# Patient Record
Sex: MALE | Race: WHITE
[De-identification: names, ages, dates, MRNs, and addresses within clinical notes are randomized per-mention and may not be internally consistent; named-entity substitution may affect disease eponyms.]

---

## 2018-02-23 ENCOUNTER — HOSPITAL ENCOUNTER (EMERGENCY)
Dept: HOSPITAL 45 - C.EDB | Age: 28
Discharge: HOME | End: 2018-02-23
Payer: COMMERCIAL

## 2018-02-23 VITALS — SYSTOLIC BLOOD PRESSURE: 120 MMHG | OXYGEN SATURATION: 98 % | HEART RATE: 70 BPM | DIASTOLIC BLOOD PRESSURE: 61 MMHG

## 2018-02-23 VITALS
BODY MASS INDEX: 26.64 KG/M2 | WEIGHT: 196.65 LBS | HEIGHT: 72.01 IN | WEIGHT: 196.65 LBS | BODY MASS INDEX: 26.64 KG/M2 | HEIGHT: 72.01 IN

## 2018-02-23 VITALS — OXYGEN SATURATION: 97 %

## 2018-02-23 VITALS — TEMPERATURE: 97.34 F
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
## 2018-02-23 DIAGNOSIS — R21: Primary | ICD-10-CM
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
## 2018-02-23 DIAGNOSIS — T78.40XA: ICD-10-CM

## 2018-02-23 LAB
BASOPHILS # BLD: 0.08 K/UL (ref 0–0.2)
BASOPHILS NFR BLD: 0.4 %
BUN SERPL-MCNC: 39 MG/DL (ref 7–18)
CALCIUM SERPL-MCNC: 8.8 MG/DL (ref 8.5–10.1)
CO2 SERPL-SCNC: 25 MMOL/L (ref 21–32)
CREAT SERPL-MCNC: 1.27 MG/DL (ref 0.6–1.4)
EOS ABS #: 0.42 K/UL (ref 0–0.5)
EOSINOPHIL NFR BLD AUTO: 208 K/UL (ref 130–400)
EOSINOPHIL NFR BLD AUTO: 271 K/UL (ref 130–400)
GLUCOSE SERPL-MCNC: 97 MG/DL (ref 70–99)
HCT VFR BLD CALC: 42.6 % (ref 42–52)
HCT VFR BLD CALC: 47.3 % (ref 42–52)
HGB BLD-MCNC: 14.5 G/DL (ref 14–18)
HGB BLD-MCNC: 16.2 G/DL (ref 14–18)
IG#: 0.05 K/UL (ref 0–0.02)
IMM GRANULOCYTES NFR BLD AUTO: 14 %
LYMPHOCYTES # BLD: 2.86 K/UL (ref 1.2–3.4)
MCH RBC QN AUTO: 32.9 PG (ref 25–34)
MCH RBC QN AUTO: 33.2 PG (ref 25–34)
MCHC RBC AUTO-ENTMCNC: 34 G/DL (ref 32–36)
MCHC RBC AUTO-ENTMCNC: 34.2 G/DL (ref 32–36)
MCV RBC AUTO: 96.6 FL (ref 80–100)
MCV RBC AUTO: 96.9 FL (ref 80–100)
MONO ABS #: 1.54 K/UL (ref 0.11–0.59)
MONOCYTES NFR BLD: 7.5 %
NEUT ABS #: 15.49 K/UL (ref 1.4–6.5)
NEUTROPHILS # BLD AUTO: 2.1 %
NEUTROPHILS NFR BLD AUTO: 75.8 %
PMV BLD AUTO: 10.1 FL (ref 7.4–10.4)
PMV BLD AUTO: 10.2 FL (ref 7.4–10.4)
POTASSIUM SERPL-SCNC: 3.8 MMOL/L (ref 3.5–5.1)
RED CELL DISTRIBUTION WIDTH CV: 13.5 % (ref 11.5–14.5)
RED CELL DISTRIBUTION WIDTH CV: 13.6 % (ref 11.5–14.5)
RED CELL DISTRIBUTION WIDTH SD: 47.6 FL (ref 36.4–46.3)
RED CELL DISTRIBUTION WIDTH SD: 48.5 FL (ref 36.4–46.3)
SODIUM SERPL-SCNC: 138 MMOL/L (ref 136–145)
WBC # BLD AUTO: 12.93 K/UL (ref 4.8–10.8)
WBC # BLD AUTO: 20.44 K/UL (ref 4.8–10.8)

## 2018-02-23 NOTE — DIAGNOSTIC IMAGING REPORT
CHEST 2 VIEWS ROUTINE



CLINICAL HISTORY: chest tightness, allergic reaction    



COMPARISON STUDY:  No previous studies for comparison.



FINDINGS: The cardiac and mediastinal contours are normal. There is no evidence

of focal pulmonary consolidation. There is no evidence of failure. No pleural

effusions are visualized.[ 



IMPRESSION: No active disease in the chest.







Electronically signed by:  Braeden Núñez M.D.

2/23/2018 5:04 PM



Dictated Date/Time:  2/23/2018 5:03 PM

## 2018-02-23 NOTE — EMERGENCY ROOM VISIT NOTE
ED Visit Note


First contact with patient:  16:19


CHIEF  COMPLAINT:  Allergic reaction





HISTORY OF PRESENTING ILLNESS: This is a 27-year-old male who presents to the 

emergency department with complaint of an allergic reaction today.  Patient 

states that he went for a 9 mile run earlier today outside, states he felt fine 

during the run and immediately after the run.  He states while he was driving 

home that he started to feel some tightness in his chest.  When he got home, he 

vomited 1 and the tightness in his chest resolved.  He states that his vomit 

looked black with bits of food in it.  He does note that he ate a hamburger 

late last night.  He states that after he got out of the shower, he noticed 

hives all over his chest.  He reports a bad allergic reaction with hives in the 

past, for which he was given an Epi-Pen, and he felt this reaction was similar, 

so he gave himself his EpiPen injection.  He states that his heart was racing 

and he felt nauseated afterward, but his hives have not improved.  He denies 

any symptoms of facial or lip swelling, tongue swelling, tightness in his throat

, difficulty breathing, wheezing, dizziness or syncope.  He currently denies 

any chest pain or SOB, and states "I pretty much feel back to normal, but I 

wanted to get checked out."  He denies any new foods, lotions, detergents,  

medications, or other exposures.





REVIEW OF SYSTEMS: A complete 10 point review of systems was reviewed with the 

patient with pertinent positives and negatives as per history of present 

illness. All else were negative.





PAST MEDICAL HISTORY:  No significant past medical or surgical history.  UTD on 

immunizations. 





SOCIAL HISTORY: Lives at home.  Denies tobacco use, reports occasional alcohol 

use, denies illicit drug use.





ALLERGIES: No known allergies.





PHYSICAL EXAM:


CONSTITUTIONAL: Pleasant and cooperative.  No acute distress. Well appearing 

and well nourished. 


HEENT: Normocephalic, atraumatic.  PERRL, EOMI, normal conjunctiva bilaterally. 

TMs normal.  No facial or lip swelling.  No tongue swelling.  Pharynx normal, 

no pharyngeal edema.  Patent airway.  Moist mucous membranes.


NECK: Supple, full active range of motion without discomfort.  No cervical 

adenopathy.


RESPIRATORY: Clear to auscultation bilaterally with no wheezing, crackles, 

rhonchi or stridor. Equal expansion bilaterally.


CARDIOVASCULAR: Regular rate and rhythm with no murmurs, rubs or gallops. 

Normal peripheral perfusion. No edema.


GASTROINTESTINAL: Soft, nontender, nondistended. No palpable masses or HSM. 

Bowel sounds present in all quadrants. 


MUSCULOSKELETAL: Full range of motion of all joints without discomfort.


INTEGUMENTARY: There is a red rash on the anterior chest and abdomen, some 

lesions appear urticarial, some appear more consistent with insect bite.  

Nontender to palpation.  Blanching.  No pustules or discharge noted.  No other 

significant dermatologic conditions noted.


NEUROLOGIC: Alert and oriented X 4 with normal affect.  Cranial nerves II-XII 

grossly intact. No focal neurologic deficits noted.  Normal strength and 

sensation in all 4 extremities.  Normal speech.  Normal gait observed.








ED COURSE AND MEDICAL DECISION MAKING: 





CC: Patient presenting with complaint of allergic reaction





DIFFERENTIAL DIAGNOSIS:  Includes, but not limited to allergic reaction, insect 

bites, rash, folliculitis, gastroenteritis, gastritis, peptic ulcer disease, 

GERD, among others.





INTERPRETATION OF LABS: Leukocytosis with left shift, no anemia, no significant 

electrolyte abnormalities, normal renal function.





IMAGING:  


CHEST 2 VIEWS ROUTINE





CLINICAL HISTORY: chest tightness, allergic reaction    





COMPARISON STUDY:  No previous studies for comparison.





FINDINGS: The cardiac and mediastinal contours are normal. There is no evidence


of focal pulmonary consolidation. There is no evidence of failure. No pleural


effusions are visualized.





IMPRESSION: No active disease in the chest.





EKG: Shows normal sinus rhythm with sinus arrhythmia, no acute ischemic changes 

by my interpretation.  No previous EKGs available for comparison.





MEDICATION RECONCILIATION:  I attest that I have personally reviewed the patient

's current medication list.





INITIAL VITAL SIGNS REVIEW:  I reviewed the patient's initial vital signs and 

interpret them as follows: T: Afebrile;  BP: Normotensive;  HR: Within normal 

limits;  RR: Tachypneic;  Pulse Ox: Within normal limits on room air.


Blood pressure screening: The patient was found to have normal blood pressure 

on screening and does not require follow-up for repeat blood pressure check.





SUMMARY: 


Patient was evaluated at bedside, history and physical exam performed.


Patient is alert and oriented, in no acute distress and with no complaints, 

resting calmly in the stretcher.


Patient is noted to have red rash on chest, multiple lesions appear consistent 

with insect bites.  


EKG reviewed at bedside, showing sinus rhythm with no acute ischemic changes.


Orders were placed at bedside for labs, IV fluid bolus, IV Benadryl and Pepcid, 

and chest x-ray to evaluate for cardiopulmonary disease.


Patient discussed with Dr. Howell, who agrees with my assessment and plan.


Labs and imaging reviewed as above, notable for marketed leukocytosis, which I 

suspect may be secondary to stress response given the injection of epinephrine.


I discussed findings with Dr. Howell, he recommended aggressively hydrating the 

patient with a second liter of normal saline, and repeat the CBC.


On recheck, CBC shows significant improvement in leukocytosis after hydration.


Patient reassessed multiple times throughout ED stay, he continues to feel 

well.  The rash on his chest and abdomen appears to be improved after receiving 

the Benadryl and Pepcid.


He has not had any further nausea or vomiting and is tolerating oral fluids 

without difficulty.


Patient was updated on all results and plan for discharge, he was encouraged to 

follow closely with his primary care provider for further evaluation.


The patient was provided with education regarding the appropriate use of the 

EpiPen, and cautioned against using this inappropriately.


Patient was also given strict return precautions should his symptoms worsen, he 

verbalized understanding.


Patient was discharged home in stable condition and ambulatory.


Current/Historical Medications


Scheduled


Multivitamin (Multivitamin), 1 TAB PO DAILY





Allergies


Coded Allergies:  


     No Known Allergies (Unverified , 2/23/18)





Vital Signs











  Date Time  Temp Pulse Resp B/P (MAP) Pulse Ox O2 Delivery O2 Flow Rate FiO2


 


2/23/18 20:05  70 16 120/61 98   


 


2/23/18 17:39  66 16 111/59 98 Room Air  


 


2/23/18 16:35  81      


 


2/23/18 16:31     99 Room Air  


 


2/23/18 16:22     97 Room Air  


 


2/23/18 16:10 36.3 87 22 135/80 98 Room Air  











Laboratory Results


2/23/18 18:53








2/23/18 16:25

















Test


  2/23/18


16:25 2/23/18


18:53


 


Immature Granulocyte % (Auto) 0.2 %  


 


White Blood Count


  20.44 K/uL


(4.8-10.8) 


 


 


Red Blood Count


  4.88 M/uL


(4.7-6.1) 4.41 M/uL


(4.7-6.1)


 


Hemoglobin


  16.2 g/dL


(14.0-18.0) 


 


 


Hematocrit 47.3 % (42-52)  


 


Mean Corpuscular Volume


  96.9 fL


() 96.6 fL


()


 


Mean Corpuscular Hemoglobin


  33.2 pg


(25-34) 32.9 pg


(25-34)


 


Mean Corpuscular Hemoglobin


Concent 34.2 g/dl


(32-36) 34.0 g/dl


(32-36)


 


Platelet Count


  271 K/uL


(130-400) 


 


 


Mean Platelet Volume


  10.2 fL


(7.4-10.4) 10.1 fL


(7.4-10.4)


 


Neutrophils (%) (Auto) 75.8 %  


 


Lymphocytes (%) (Auto) 14.0 %  


 


Monocytes (%) (Auto) 7.5 %  


 


Eosinophils (%) (Auto) 2.1 %  


 


Basophils (%) (Auto) 0.4 %  


 


Neutrophils # (Auto)


  15.49 K/uL


(1.4-6.5) 


 


 


Lymphocytes # (Auto)


  2.86 K/uL


(1.2-3.4) 


 


 


Monocytes # (Auto)


  1.54 K/uL


(0.11-0.59) 


 


 


Eosinophils # (Auto)


  0.42 K/uL


(0-0.5) 


 


 


Basophils # (Auto)


  0.08 K/uL


(0-0.2) 


 


 


Immature Granulocyte # (Auto)


  0.05 K/uL


(0.00-0.02) 


 


 


Anion Gap


  10.0 mmol/L


(3-11) 


 


 


Est Creatinine Clear Calc


Drug Dose 96.8 ml/min 


  


 


 


Estimated GFR (


American) 89.8 


  


 


 


Estimated GFR (Non-


American 77.5 


  


 


 


BUN/Creatinine Ratio 30.8 (10-20)  


 


Calcium Level


  8.8 mg/dl


(8.5-10.1) 


 


 


RDW Standard Deviation


  


  47.6 fL


(36.4-46.3)


 


RDW Coefficient of Variation


  


  13.5 %


(11.5-14.5)











Medications Administered











 Medications


  (Trade)  Dose


 Ordered  Sig/Nik


 Route  Start Time


 Stop Time Status Last Admin


Dose Admin


 


 Sodium Chloride  1,000 ml @ 


 999 mls/hr  Q1H1M STAT


 IV  2/23/18 16:38


 2/23/18 17:38 DC 2/23/18 16:48


999 MLS/HR


 


 Diphenhydramine


 HCl


  (Benadryl Inj)  50 mg  NOW  STAT


 IV  2/23/18 16:38


 2/23/18 16:40 DC 2/23/18 16:48


50 MG


 


 Famotidine


  (Pepcid 20mg Iv


 Push)  20 mg  ONE  STAT


 IV  2/23/18 16:38


 2/23/18 16:40 DC 2/23/18 16:48


20 MG


 


 Sodium Chloride  1,000 ml @ 


 999 mls/hr  Q1H1M STAT


 IV  2/23/18 17:39


 2/23/18 18:39 DC 2/23/18 17:39


999 MLS/HR











Departure Information


Impression





 Primary Impression:  


 Rash


 Additional Impression:  


 Allergic reaction





Dispostion


Home / Self-Care





Condition


GOOD





Referrals


Rinku Thayer M.D. (PCP)





Patient Instructions


ED Allergic Reaction General Other, EpiPen Auto Injector Dc, Critical access hospital





Additional Instructions





You have been treated in the Emergency Department for your rash and monitored 

after using your EpiPen. 





You may take Benadryl (diphenhydramine) 25-50 mg orally every 4-6 hours as 

needed for itching.  This medication is over-the-counter and you will NOT need 

a prescription to purchase this at your local pharmacy. 





You may also apply topical hydrocortisone cream to the rash twice a day for 

severe itching. 





As with every Emergency Department visit, you should follow-up with your 

primary care provider in 2-3 days for reevaluation.





Return to the Emergency Department for any worsening symptoms, including pain/

redness/swelling of the rash, pus drainage, fevers/chills, or other signs of 

skin infection, or if you have chest pain, shortness of breath, wheezing, 

tongue or face swelling, tightness in your throat, or fainting.





Work Instructions


Return To Work:  1 day





Problem Qualifiers








 Additional Impression:  


 Allergic reaction


 Encounter type:  initial encounter  Qualified Codes:  T78.40XA - Allergy, 

unspecified, initial encounter